# Patient Record
Sex: FEMALE
[De-identification: names, ages, dates, MRNs, and addresses within clinical notes are randomized per-mention and may not be internally consistent; named-entity substitution may affect disease eponyms.]

---

## 2023-08-01 ENCOUNTER — APPOINTMENT (OUTPATIENT)
Dept: OTOLARYNGOLOGY | Facility: CLINIC | Age: 68
End: 2023-08-01
Payer: MEDICARE

## 2023-08-01 ENCOUNTER — LABORATORY RESULT (OUTPATIENT)
Age: 68
End: 2023-08-01

## 2023-08-01 DIAGNOSIS — Z83.3 FAMILY HISTORY OF DIABETES MELLITUS: ICD-10-CM

## 2023-08-01 DIAGNOSIS — Z82.49 FAMILY HISTORY OF ISCHEMIC HEART DISEASE AND OTHER DISEASES OF THE CIRCULATORY SYSTEM: ICD-10-CM

## 2023-08-01 DIAGNOSIS — Z82.2 FAMILY HISTORY OF DEAFNESS AND HEARING LOSS: ICD-10-CM

## 2023-08-01 DIAGNOSIS — Z85.9 PERSONAL HISTORY OF MALIGNANT NEOPLASM, UNSPECIFIED: ICD-10-CM

## 2023-08-01 PROBLEM — Z00.00 ENCOUNTER FOR PREVENTIVE HEALTH EXAMINATION: Status: ACTIVE | Noted: 2023-08-01

## 2023-08-01 PROCEDURE — 99204 OFFICE O/P NEW MOD 45 MIN: CPT | Mod: 25

## 2023-08-01 PROCEDURE — 41105 BIOPSY OF TONGUE: CPT

## 2023-08-01 PROCEDURE — 31575 DIAGNOSTIC LARYNGOSCOPY: CPT

## 2023-08-01 NOTE — REVIEW OF SYSTEMS
[Patient Intake Form Reviewed] : Patient intake form was reviewed [Ear Pain] : ear pain [Throat Pain] : throat pain [Throat Dryness] : throat dryness [Swelling Neck] : swelling neck [As Noted in HPI] : as noted in HPI [Dry Eyes] : dry eyes [Negative] : Heme/Lymph

## 2023-08-09 ENCOUNTER — APPOINTMENT (OUTPATIENT)
Dept: OTOLARYNGOLOGY | Facility: CLINIC | Age: 68
End: 2023-08-09
Payer: MEDICARE

## 2023-08-09 VITALS
TEMPERATURE: 98.3 F | HEIGHT: 67 IN | SYSTOLIC BLOOD PRESSURE: 147 MMHG | OXYGEN SATURATION: 97 % | DIASTOLIC BLOOD PRESSURE: 88 MMHG | BODY MASS INDEX: 32.18 KG/M2 | WEIGHT: 205 LBS | HEART RATE: 68 BPM

## 2023-08-09 DIAGNOSIS — R22.1 LOCALIZED SWELLING, MASS AND LUMP, NECK: ICD-10-CM

## 2023-08-09 DIAGNOSIS — C06.0 MALIGNANT NEOPLASM OF CHEEK MUCOSA: ICD-10-CM

## 2023-08-09 DIAGNOSIS — M54.2 CERVICALGIA: ICD-10-CM

## 2023-08-09 PROCEDURE — 99213 OFFICE O/P EST LOW 20 MIN: CPT | Mod: 24

## 2023-08-09 NOTE — PHYSICAL EXAM
[Normal] : no rashes [de-identified] :  Oral biopsy. 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

## 2023-08-09 NOTE — PROCEDURE
[Image(s) Captured] : image(s) captured and filed [Topical Lidocaine] : topical lidocaine [Oxymetazoline HCl] : oxymetazoline HCl [Flexible Endoscope] : examined with the flexible endoscope [Serial Number: ___] : Serial Number: [unfilled] [FreeTextEntry1] : Oral Biopsy  [FreeTextEntry3] : Oral biopsy. Lingual Tonsil right tongue base  [de-identified] : Oral biopsy. Lingual Tonsil right tongue base   Oral biopsy. 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

## 2023-08-09 NOTE — REASON FOR VISIT
[Subsequent Evaluation] : a subsequent evaluation for [Family Member] : family member [FreeTextEntry2] : Oral lesion for the past 2 months.

## 2023-08-09 NOTE — HISTORY OF PRESENT ILLNESS
[de-identified] : 68 years old female patient with history of Oral lesion for the past 2 months.   Patient is present today in the office with persistent Lingual Tonsil right tongue base.  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  Biopsy impressions

## 2023-08-10 PROBLEM — M54.2 NECK PAIN: Status: ACTIVE | Noted: 2023-08-10

## 2023-08-10 PROBLEM — R22.1 NECK SWELLING: Status: ACTIVE | Noted: 2023-08-10

## 2023-08-15 LAB — FNA, OTHER: NORMAL

## 2023-08-16 ENCOUNTER — APPOINTMENT (OUTPATIENT)
Dept: OTOLARYNGOLOGY | Facility: CLINIC | Age: 68
End: 2023-08-16

## 2023-08-21 NOTE — PHYSICAL EXAM
[Normal] : no abnormal secretions [de-identified] :  Oral biopsy. 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

## 2023-08-21 NOTE — HISTORY OF PRESENT ILLNESS
[de-identified] : 68 years old female patient with history of Oral lesion for the past 2 months.   Patient is present today in the office at the request of Dr. Gant for consultation and evaluation of Oral biopsy. 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

## 2023-08-21 NOTE — CONSULT LETTER
[Dear  ___] : Dear  [unfilled], [Consult Letter:] : I had the pleasure of evaluating your patient, [unfilled]. [Please see my note below.] : Please see my note below. [Consult Closing:] : Thank you very much for allowing me to participate in the care of this patient.  If you have any questions, please do not hesitate to contact me. [Sincerely,] : Sincerely, [FreeTextEntry3] : Neri Pearce MD, FACS  Professor of Otolaryngology, [employer] at [facility]  Director, [facility], Department of Otolaryngology, [facility]  , Head & Neck Service Line, [facility]

## 2023-08-21 NOTE — PROCEDURE
[Image(s) Captured] : image(s) captured and filed [Topical Lidocaine] : topical lidocaine [Oxymetazoline HCl] : oxymetazoline HCl [Flexible Endoscope] : examined with the flexible endoscope [Serial Number: ___] : Serial Number: [unfilled] [FreeTextEntry1] : Oral Biopsy  [FreeTextEntry3] : Oral biopsy. Lingual Tonsil right tongue base  [de-identified] : Oral biopsy. Lingual Tonsil right tongue base   Oral biopsy. 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

## 2023-08-21 NOTE — REASON FOR VISIT
[Initial Consultation] : an initial consultation for [Family Member] : family member [FreeTextEntry2] : Oral lesion for the past 2 months.  Patient states her level of severity is a level 7 out of 10 and it occus constant.  Patient states nothing helps to improve or worsens her condition.

## 2023-09-13 ENCOUNTER — APPOINTMENT (OUTPATIENT)
Dept: OTOLARYNGOLOGY | Facility: CLINIC | Age: 68
End: 2023-09-13
Payer: MEDICARE

## 2023-09-13 VITALS
OXYGEN SATURATION: 99 % | HEIGHT: 67 IN | SYSTOLIC BLOOD PRESSURE: 148 MMHG | TEMPERATURE: 97.8 F | DIASTOLIC BLOOD PRESSURE: 89 MMHG | BODY MASS INDEX: 32.18 KG/M2 | HEART RATE: 79 BPM | WEIGHT: 205 LBS

## 2023-09-13 DIAGNOSIS — R22.1 LOCALIZED SWELLING, MASS AND LUMP, NECK: ICD-10-CM

## 2023-09-13 DIAGNOSIS — J35.1 HYPERTROPHY OF TONSILS: ICD-10-CM

## 2023-09-13 DIAGNOSIS — K13.70 UNSPECIFIED LESIONS OF ORAL MUCOSA: ICD-10-CM

## 2023-09-13 PROCEDURE — 31575 DIAGNOSTIC LARYNGOSCOPY: CPT

## 2023-09-13 PROCEDURE — 99213 OFFICE O/P EST LOW 20 MIN: CPT | Mod: 25

## 2023-09-25 ENCOUNTER — TRANSCRIPTION ENCOUNTER (OUTPATIENT)
Age: 68
End: 2023-09-25

## 2023-09-25 RX ORDER — ACETAMINOPHEN AND CODEINE PHOSPHATE 300; 30 MG/1; MG/1
300-30 TABLET ORAL
Qty: 25 | Refills: 0 | Status: ACTIVE | COMMUNITY
Start: 2023-09-25 | End: 1900-01-01

## 2023-09-25 RX ORDER — METHYLPREDNISOLONE 4 MG/1
4 TABLET ORAL
Qty: 1 | Refills: 0 | Status: ACTIVE | COMMUNITY
Start: 2023-09-25 | End: 1900-01-01

## 2023-09-25 RX ORDER — AMOXICILLIN 500 MG/1
500 CAPSULE ORAL
Qty: 14 | Refills: 0 | Status: ACTIVE | COMMUNITY
Start: 2023-09-25 | End: 1900-01-01

## 2023-09-25 RX ORDER — DOCUSATE SODIUM 100 MG/1
100 CAPSULE ORAL TWICE DAILY
Qty: 60 | Refills: 0 | Status: ACTIVE | COMMUNITY
Start: 2023-09-25 | End: 1900-01-01

## 2023-09-25 NOTE — ASU PATIENT PROFILE, ADULT - MEDICATIONS BROUGHT TO HOSPITAL, PROFILE
Pt drank water with no n/v.   Pt discharged home with parent/guardian. Pt acting age appropriately, respirations regular and unlabored, cap refill less than two seconds. Skin pink, dry and warm. Lungs clear bilaterally. No further complaints at this time. Parent/guardian verbalized understanding of discharge paperwork and has no further questions at this time. Education provided about continuation of care, follow up care and medication administration. Parent/guardian able to provided teach back about discharge instructions.
Pt with no n/v since medicated. Pt given water for po challenge.
no

## 2023-09-26 ENCOUNTER — TRANSCRIPTION ENCOUNTER (OUTPATIENT)
Age: 68
End: 2023-09-26

## 2023-09-26 ENCOUNTER — APPOINTMENT (OUTPATIENT)
Dept: OTOLARYNGOLOGY | Facility: AMBULATORY SURGERY CENTER | Age: 68
End: 2023-09-26

## 2023-09-26 ENCOUNTER — OUTPATIENT (OUTPATIENT)
Dept: OUTPATIENT SERVICES | Facility: HOSPITAL | Age: 68
LOS: 1 days | Discharge: HOME | End: 2023-09-26
Payer: MEDICARE

## 2023-09-26 ENCOUNTER — RESULT REVIEW (OUTPATIENT)
Age: 68
End: 2023-09-26

## 2023-09-26 VITALS
TEMPERATURE: 98 F | SYSTOLIC BLOOD PRESSURE: 151 MMHG | DIASTOLIC BLOOD PRESSURE: 92 MMHG | RESPIRATION RATE: 16 BRPM | WEIGHT: 203.93 LBS | HEIGHT: 67 IN | OXYGEN SATURATION: 97 % | HEART RATE: 75 BPM

## 2023-09-26 VITALS
OXYGEN SATURATION: 95 % | DIASTOLIC BLOOD PRESSURE: 77 MMHG | HEART RATE: 77 BPM | SYSTOLIC BLOOD PRESSURE: 122 MMHG | TEMPERATURE: 97 F | RESPIRATION RATE: 16 BRPM

## 2023-09-26 DIAGNOSIS — Z98.51 TUBAL LIGATION STATUS: Chronic | ICD-10-CM

## 2023-09-26 DIAGNOSIS — Z98.891 HISTORY OF UTERINE SCAR FROM PREVIOUS SURGERY: Chronic | ICD-10-CM

## 2023-09-26 DIAGNOSIS — Z90.89 ACQUIRED ABSENCE OF OTHER ORGANS: Chronic | ICD-10-CM

## 2023-09-26 PROCEDURE — 88341 IMHCHEM/IMCYTCHM EA ADD ANTB: CPT | Mod: 26

## 2023-09-26 PROCEDURE — 88305 TISSUE EXAM BY PATHOLOGIST: CPT | Mod: 26

## 2023-09-26 PROCEDURE — 88173 CYTOPATH EVAL FNA REPORT: CPT | Mod: 26

## 2023-09-26 PROCEDURE — 88342 IMHCHEM/IMCYTCHM 1ST ANTB: CPT | Mod: 26

## 2023-09-26 PROCEDURE — 41113 EXCISION OF TONGUE LESION: CPT

## 2023-09-26 PROCEDURE — 10021 FNA BX W/O IMG GDN 1ST LES: CPT

## 2023-09-26 DEVICE — SURGIFLO HEMOSTATIC MATRIX KIT: Type: IMPLANTABLE DEVICE | Site: RIGHT | Status: FUNCTIONAL

## 2023-09-26 RX ORDER — SODIUM CHLORIDE 9 MG/ML
1000 INJECTION, SOLUTION INTRAVENOUS
Refills: 0 | Status: DISCONTINUED | OUTPATIENT
Start: 2023-09-26 | End: 2023-09-26

## 2023-09-26 RX ORDER — FENTANYL CITRATE 50 UG/ML
25 INJECTION INTRAVENOUS
Refills: 0 | Status: DISCONTINUED | OUTPATIENT
Start: 2023-09-26 | End: 2023-09-26

## 2023-09-26 NOTE — ASU DISCHARGE PLAN (ADULT/PEDIATRIC) - ASU DC SPECIAL INSTRUCTIONSFT
Follow these guidelines after your FNA:    You may have mild discomfort after your  fine Needle Aspiration (FNA).     You may also have some mild swelling after your FNA. If you do, put a cold compress (such as an ice pack or a cool washcloth) on the area for 20 minutes to help reduce the swelling.    Post-Operative Nutritional Suggestions:    Avoid acidic, spicy, hard, or crunchy foods that may cause pain or bleeding, sodas, and caffeinated drinks for approximately 2 WEEKS AFTER SURGERY.    Start all medications on Wednesday

## 2023-09-26 NOTE — BRIEF OPERATIVE NOTE - SPECIMENS
Right Base Tongue Tumor.  Right Neck Fine Needle Aspiration Right Tongue Base Tumor.  Right Neck Fine Needle Aspiration

## 2023-09-26 NOTE — BRIEF OPERATIVE NOTE - NSICDXBRIEFPROCEDURE_GEN_ALL_CORE_FT
PROCEDURES:  Excision of lesion of posterior one-third of tongue 26-Sep-2023 10:30:32  Monique Mathis  Fine needle aspiration biopsy of neck 26-Sep-2023 10:31:00  Monique Mathis

## 2023-09-26 NOTE — PRE-ANESTHESIA EVALUATION ADULT - NSANTHNECKRD_ENT_A_CORE
01/07/19 1000   Anxiety Tool TORSTEN - 7   Feeling Nervous, Anxious, or on Edge 2 - More than half the days   Not Being Able to Stop or Control Worrying 3 - Nearly every day   Worrying Too Much About Different Things 2 - More than half the days   Trouble Relaxing 2 - More than half the days   Being So Restless it is Hard to Sit Still 0 - Not at all   Becoming Easily Annoyed or Irritable 1 - Several days   Feeling Afraid as if Something Awful Might Happen 2 - More than half the days   Total Score 12     DAISHA Egan No

## 2023-09-26 NOTE — BRIEF OPERATIVE NOTE - NSICDXBRIEFPOSTOP_GEN_ALL_CORE_FT
POST-OP DIAGNOSIS:  Neoplasm of tongue 26-Sep-2023 10:32:32  Monique Mathis A  Lesion of tongue 26-Sep-2023 10:32:39  Monique Mathis A  Mass of neck 26-Sep-2023 10:32:44  Monique Mathis A  History of neck pain 26-Sep-2023 10:32:51  Monique Mathis A

## 2023-09-26 NOTE — BRIEF OPERATIVE NOTE - NSICDXBRIEFPREOP_GEN_ALL_CORE_FT
PRE-OP DIAGNOSIS:  Lesion of tongue 26-Sep-2023 10:31:19  Monique Mathis A  Mass of neck 26-Sep-2023 10:31:29  Monique Mathis A  Neck pain 26-Sep-2023 10:31:45  Monique Mathis A  Neoplasm of tongue 26-Sep-2023 10:32:06  Monique Mathis A

## 2023-09-26 NOTE — ASU DISCHARGE PLAN (ADULT/PEDIATRIC) - NS MD DC FALL RISK RISK
For information on Fall & Injury Prevention, visit: [email or web address] OR  [email or web address] OR  https://www.cdc.gov/steadi/patient.html

## 2023-09-26 NOTE — BRIEF OPERATIVE NOTE - COMMENTS
Please schedule an appointment to see Dr. Neri Pearce in the outpatient department. (His Private Practice)

## 2023-09-29 LAB
NON-GYNECOLOGICAL CYTOLOGY STUDY: SIGNIFICANT CHANGE UP

## 2023-10-04 LAB
SURGICAL PATHOLOGY STUDY: SIGNIFICANT CHANGE UP

## 2023-10-11 ENCOUNTER — APPOINTMENT (OUTPATIENT)
Dept: OTOLARYNGOLOGY | Facility: CLINIC | Age: 68
End: 2023-10-11

## 2024-04-16 ENCOUNTER — APPOINTMENT (RX ONLY)
Dept: URBAN - METROPOLITAN AREA CLINIC 91 | Facility: CLINIC | Age: 69
Setting detail: DERMATOLOGY
End: 2024-04-16

## 2024-04-16 DIAGNOSIS — L40.0 PSORIASIS VULGARIS: ICD-10-CM

## 2024-04-16 PROCEDURE — ? PRESCRIPTION

## 2024-04-16 PROCEDURE — ? COUNSELING

## 2024-04-16 PROCEDURE — 99204 OFFICE O/P NEW MOD 45 MIN: CPT

## 2024-04-16 RX ORDER — CLOBETASOL PROPIONATE 0.5 MG/ML
SOLUTION TOPICAL QHS
Qty: 50 | Refills: 5 | Status: ERX | COMMUNITY
Start: 2024-04-16

## 2024-04-16 RX ADMIN — CLOBETASOL PROPIONATE: 0.5 SOLUTION TOPICAL at 00:00

## 2024-04-16 ASSESSMENT — LOCATION DETAILED DESCRIPTION DERM
LOCATION DETAILED: MID-OCCIPITAL SCALP
LOCATION DETAILED: LEFT CAVUM CONCHA

## 2024-04-16 ASSESSMENT — LOCATION ZONE DERM
LOCATION ZONE: EAR
LOCATION ZONE: SCALP

## 2024-04-16 ASSESSMENT — LOCATION SIMPLE DESCRIPTION DERM
LOCATION SIMPLE: POSTERIOR SCALP
LOCATION SIMPLE: LEFT EAR

## 2024-04-16 NOTE — HPI: RASH
Valve-per provider CPM of 5mg every day and recheck in 4 weeks.  
What Type Of Note Output Would You Prefer (Optional)?: Bullet Format
Is The Patient Presenting As Previously Scheduled?: Yes
Is This A New Presentation, Or A Follow-Up?: Rash

## (undated) DEVICE — DRSG MASTISOL

## (undated) DEVICE — GLV 6 PROTEXIS (WHITE)

## (undated) DEVICE — SUT VICRYL 5-0 18" P-3 UNDYED

## (undated) DEVICE — PREP BETADINE SPONGE STICKS

## (undated) DEVICE — WARMING BLANKET LOWER ADULT

## (undated) DEVICE — DRAPE TOWEL BLUE 17" X 24"

## (undated) DEVICE — VENODYNE/SCD SLEEVE CALF MEDIUM

## (undated) DEVICE — STAPLER SKIN VISI-STAT 35 WIDE

## (undated) DEVICE — DRSG TELFA 3 X 8

## (undated) DEVICE — GLV 7.5 PROTEXIS (WHITE)

## (undated) DEVICE — SYR LUER LOK 3CC

## (undated) DEVICE — MARKING PEN DEVON DUAL TIP W RULER

## (undated) DEVICE — ELCTR BIPOLAR CORD 12FT

## (undated) DEVICE — DRSG STERISTRIPS 0.25 X 4"

## (undated) DEVICE — NDL HYPO REGULAR BEVEL 25G X 1.5" (BLUE)

## (undated) DEVICE — ELCTR COLORADO 3CM

## (undated) DEVICE — ELCTR BOVIE SUCTION 8FR 6"

## (undated) DEVICE — NDL HYPO REGULAR BEVEL 27G X 1.25" (GRAY)

## (undated) DEVICE — DRAPE TOP SHEET 53" X 101"

## (undated) DEVICE — TUBING SUCTION NONCONDUCTIVE 6MM X 12FT

## (undated) DEVICE — DRSG STERISTRIPS 0.5 X 4"

## (undated) DEVICE — SUT PLAIN GUT 5-0 18" P-3

## (undated) DEVICE — PACK BLEPHAROPLASTY

## (undated) DEVICE — SUT MONOCRYL 5-0 18" P-3 UNDYED

## (undated) DEVICE — SUT ETHILON 6-0 18" P-3